# Patient Record
Sex: FEMALE | Race: WHITE
[De-identification: names, ages, dates, MRNs, and addresses within clinical notes are randomized per-mention and may not be internally consistent; named-entity substitution may affect disease eponyms.]

---

## 2019-09-02 ENCOUNTER — HOSPITAL ENCOUNTER (EMERGENCY)
Dept: HOSPITAL 72 - EMR | Age: 39
Discharge: HOME | End: 2019-09-02
Payer: COMMERCIAL

## 2019-09-02 VITALS — WEIGHT: 200 LBS | HEIGHT: 67 IN | BODY MASS INDEX: 31.39 KG/M2

## 2019-09-02 VITALS — DIASTOLIC BLOOD PRESSURE: 98 MMHG | SYSTOLIC BLOOD PRESSURE: 150 MMHG

## 2019-09-02 VITALS — DIASTOLIC BLOOD PRESSURE: 105 MMHG | SYSTOLIC BLOOD PRESSURE: 152 MMHG

## 2019-09-02 DIAGNOSIS — S83.92XA: Primary | ICD-10-CM

## 2019-09-02 DIAGNOSIS — M25.462: ICD-10-CM
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
## 2019-09-02 DIAGNOSIS — Z91.040: ICD-10-CM
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
## 2019-09-02 DIAGNOSIS — Z88.2: ICD-10-CM

## 2019-09-02 PROCEDURE — 81025 URINE PREGNANCY TEST: CPT

## 2019-09-02 PROCEDURE — 29505 APPLICATION LONG LEG SPLINT: CPT

## 2019-09-02 PROCEDURE — 99283 EMERGENCY DEPT VISIT LOW MDM: CPT

## 2019-09-02 NOTE — NUR
ED Nurse Note:

Patient walked into ED from home patient reports she slipped at the grocery 
store yesterday, 

patient reports she slipped and fell, twisted her left knee. 

Bruising noted around the left eye and the right hand. patient reports she gets 
buises easily. 

patient reports that her abdominal hernia appears to be more swollent than 
usual. 

patient is alert awake x4 ambulatory, able to bear weight, breathing unlabored 
and even.

## 2019-09-02 NOTE — NUR
ER DISCHARGE NOTE:

Patient is cleared to be discharged per ERMD, pt is aox4, on room air, with 
stable vital signs. pt was given dc and prescription instructions, pt was able 
to verbalize understanding, pt id band and removed without complications. pt is 
able to ambulate with crutches. Immobilizer provided to pt.  pt took all 
belongings.

## 2019-09-02 NOTE — NUR
HAND-OFF: 

Report given to Meredith WINN. patient is stable in bed. sandwiches and juice from 
the fridge provided for her daughters as patient requested.

## 2019-09-02 NOTE — EMERGENCY ROOM REPORT
History of Present Illness


General


Chief Complaint:  Multiple Trauma/Fall


Source:  Patient





Present Illness


HPI


39-year-old female presents to the emergency department complaining of 7 out of 

10 severity localized left knee pain with intermittent feeling of instability.  

Patient status post mechanical slip and fall yesterday where she twisted her 

knee upon falling as well as pain in the left side of her head.  Patient denies 

loss of consciousness she denies midline neck or back pain.  Patient denies 

nausea or vomiting.  She reports bruise to the left side of her face and left 

eye.  Patient reports her knee pain is her primary symptoms she has pain 

exacerbation upon attempts to bear weight and ambulate.  She denies previous 

injury to this extremity she reports some swelling pain is also exacerbated 

with flexion she does have the ability to extend without pain.  No open wounds 

or bleeding.  Patient denies taking blood thinning medications.  Denies 

numbness tingling or loss of sensation or gross motor movements of the 

extremities, incontinence of bowel or bladder. Denies CP, Palpitations,  AMS, 

dizziness, Changes in Vision, weakness or a sudden severe headache.


Allergies:  


Coded Allergies:  


     LATEX (Verified  Allergy, Mild, Rash, 9/17/14)


     SULFA (SULFONAMIDE ANTIBIOTICS) (Unverified  Allergy, Mild, Shortness of 

Breath, 9/17/14)





Patient History


Past Medical History:  see triage record


Past Surgical History:  none


Pertinent Family History:  none


Last Menstrual Period:  july


Pregnant Now:  No


Reviewed Nursing Documentation:  PMH: Agreed; PSxH: Agreed





Nursing Documentation-PMH


Past Medical History:  No History, Except For


Hx Gastrointestinal Problems:  Yes - hernia





Review of Systems


All Other Systems:  negative except mentioned in HPI





Physical Exam





Vital Signs








  Date Time  Temp Pulse Resp B/P (MAP) Pulse Ox O2 Delivery O2 Flow Rate FiO2


 


9/2/19 17:02 98.1 84 18 152/105 (121) 97 Room Air  








Sp02 EP Interpretation:  reviewed, normal


General Appearance:  no apparent distress, alert, GCS 15, non-toxic


Head:  normocephalic, other - Healing ecchymosis about the left eye


Eyes:  left eye other - Ecchymosis around the lower lid; bilateral eye normal 

inspection, bilateral eye PERRL, bilateral eye EOMI


ENT:  hearing grossly normal, normal voice


Neck:  full range of motion, no bony tend


Respiratory:  lungs clear, normal breath sounds, speaking full sentences


Cardiovascular #1:  regular rate, rhythm, normal capillary refill


Cardiovascular #2:  2+ dorsalis pedis (R), 2+ dorsalis pedis (L)


Musculoskeletal:  back normal, gait/station normal - Compensatory favoring the 

left leg., normal range of motion, tender - Tenderness to palpation to the 

medial aspect of the left knee, some notable swelling visible, negative 

anterior and posterior drawer sign.  Moderate pain with valgus stressing no 

pain with varus stressing.  No obvious deformity otherwise no bruise no 

erythema and no palpable posterior mass.


Neurologic:  alert, oriented x3, responsive, motor strength/tone normal, 

sensory intact, cerebellar normal, speech normal, grossly normal


Psychiatric:  judgement/insight normal


Skin:  Ecchymosis/Bruising - About the lower left eyelid/orbit.





Medical Decision Making


PA Attestation


Dr. Downing is my supervising Physician whom patient management has been 

discussed with.


Diagnostic Impression:  


 Primary Impression:  


 Left knee sprain


 Qualified Codes:  S83.92XA - Sprain of unspecified site of left knee, initial 

encounter


 Additional Impression:  


 Knee effusion, left


ER Course


39-year-old female presents to the emergency department complaining of 7 out of 

10 severity localized left knee pain with intermittent feeling of instability.  

Patient status post mechanical slip and fall yesterday where she twisted her 

knee upon falling as well as pain in the left side of her head.  Patient denies 

loss of consciousness she denies midline neck or back pain.  Patient denies 

nausea or vomiting.  She reports bruise to the left side of her face and left 

eye.  Patient reports her knee pain is her primary symptoms she has pain 

exacerbation upon attempts to bear weight and ambulate.  She denies previous 

injury to this extremity she reports some swelling pain is also exacerbated 

with flexion she does have the ability to extend without pain.  No open wounds 

or bleeding.  Patient denies taking blood thinning medications.  Denies 

numbness tingling or loss of sensation or gross motor movements of the 

extremities, incontinence of bowel or bladder. Denies CP, Palpitations,  AMS, 

dizziness, Changes in Vision, weakness or a sudden severe headache. 





Ddx considered but are not limited to Fracture, dislocation, contusion,  Sprain/

Strain/Spasm,   subdural hematoma, concussion, acute head injury, orbital 

fracture just to name a few





Vital signs: are WNL, pt. is afebrile


H&PE are most consistent with musculoskeletal injury will perform imaging to r/

o fractures/dislocations of the left knee no physical exam findings to suggest 

acute orbital fracture/blowout fracture/entrapment.  No focal neurological 

deficits the patient is responsive and answering questions appropriately I do 

not suspect a significant acute head injury at this time.





ORDERS:


-Urine Hcg: Denies CP, Palpitations, LOC, AMS, dizziness, Changes in Vision, 

Sensation, paresthesias, or a sudden severe headache.





-  X-ray Left knee 3 views   - negative for fx, Dislocation, or significant 

soft tissue injury, per preliminary read in ED, and signed by  LUIS ALFREDO Zhu

, my supervising physician has reviewed, and agrees with my interpretation.





ED INTERVENTIONS:


-Tylenol PO


--Knee Immobilizer splint applied to the left Knee  by ED tech. Pt. remains 

neurovascularly intact. 


--Patient is provided with crutches and instructed on their use








DISCHARGE: At this time pt. is stable for d/c to home. Will provide printed 

patient care instructions, and any necessary prescriptions. Care plan and 

follow up instructions have been discussed with the patient prior to discharge.


Other X-Ray Diagnostic Results


Other X-Ray Diagnostic Results :  


   X-Ray ordered:  Left Knee


   # of Views/Limited Vs Complete:  3 View


   Indication:  Pain


   EP Interpretation:  Yes


   PA Xray:  Interpretation reviewed, by supervising MD, and agrees with 

findings.


   Interpretation:  no dislocation, no soft tissue swelling, no fractures


   Impression:  No acute disease


   Electronically Signed by:  Janie Zhu PA-C





Last Vital Signs








  Date Time  Temp Pulse Resp B/P (MAP) Pulse Ox O2 Delivery O2 Flow Rate FiO2


 


9/2/19 17:02 98.1 84 18 152/105 (121) 97 Room Air  








Status:  improved


Disposition:  HOME, SELF-CARE


Condition:  Stable


Scripts


Acetaminophen* (TYLENOL EXTRA STRENGTH*) 500 Mg Tablet


500 MG ORAL Q6H, #20 TAB 0 Refills


   Prov: Janie Zhu         9/2/19


Patient Instructions:  Combined Knee Ligament Sprain





Additional Instructions:  


Take medications as directed. 





 ** Follow up with an ORTHOPEDIC SPECIALIST in 3-5 days, even if your symptoms 

have resolved. ** 





If symptoms persist MRI may be required at the discretion of your PCP or Ortho 

Specialist.  ** 





--Please review list of primary care clinics, if you do not already have a 

primary care provider who can give you an Orthopedic Referral. 





Return sooner to ED if new symptoms occur, or current symptoms become worse. 


Do not drink alcohol, drive, or operate heavy machinery while taking Norco as 

this may cause drowsiness. 











- Please note that this Emergency Department Report was dictated using [employer] technology software, occasionally this can lead to 

erroneous entry secondary to interpretation by the dictation equipment.











Janie Zhu Sep 2, 2019 18:14

## 2019-09-03 NOTE — DIAGNOSTIC IMAGING REPORT
Indication: Knee pain, status post fall

 

Technique: 3 views of the left knee

 

Comparison: None

 

Findings: No acute fractures. No dislocations. Joint spaces are preserved

 

Impression: Negative